# Patient Record
Sex: FEMALE | Race: OTHER | NOT HISPANIC OR LATINO | URBAN - METROPOLITAN AREA
[De-identification: names, ages, dates, MRNs, and addresses within clinical notes are randomized per-mention and may not be internally consistent; named-entity substitution may affect disease eponyms.]

---

## 2018-04-04 VITALS
DIASTOLIC BLOOD PRESSURE: 67 MMHG | RESPIRATION RATE: 18 BRPM | HEART RATE: 55 BPM | OXYGEN SATURATION: 98 % | SYSTOLIC BLOOD PRESSURE: 135 MMHG

## 2018-04-04 RX ORDER — CHLORHEXIDINE GLUCONATE 213 G/1000ML
1 SOLUTION TOPICAL ONCE
Qty: 0 | Refills: 0 | Status: DISCONTINUED | OUTPATIENT
Start: 2018-04-05 | End: 2018-04-20

## 2018-04-04 NOTE — H&P ADULT - ASSESSMENT
78 y.o Amhar[place] Female former smoker  with LIDOCAINE ALLERGY PMHx of  HTN, Hyperlipidemia, Chronic Atrial Fibrillation on Eliquis (Last dose on   4/2/18), Non-obstructive CAD by diagnostic cath @ [facility] on 02/20/15 who presented to her cardiologist Dr. Pickens c/o progressively worsening dyspnea on exertion with intermittent episodes of palpitations upon ambulation of more than two city blocks over the past 6 months.  In light of pt's risk factors, above CCS Anginal Class 3 Equivalent symptoms, and an abnormal Stress test, pt is now referred to [facility] for recommended Cardiac Cath with possible intervention if clinically indicated to  r/o suspected progressive CAD.    ASA III, Mallampati III    IV NS @ 75 cc/hr pre-cath fluids    Load with  mg x1, plavix 600 mg x1    Sedation Plan: Moderate  Patient is Suitable Candidate for Sedation: Yes  Risks & benefits of procedure and alternative therapy have been explained to the patient including but not limited to: allergic reaction, bleeding w/possible need for blood transfusion, infection, renal and vascular compromise, limb damage, arrhythmia, stroke, vessel dissection/perforation, Myocardial infarction, emergent CABG. Informed consent obtained and in chart

## 2018-04-04 NOTE — H&P ADULT - HISTORY OF PRESENT ILLNESS
***HISTORY OBTAINED FROM PATIENT VIA PACIFIC Syriac SPEAKING INTERPRETOR ID # [id number]      ***PT TO BRING IN ALL MEDS    78 y.o Serbian Female former smoker  with LIDOCAINE ALLERGY PMHx of  HTN, Hyperlipidemia, Chronic Atrial Fibrillation on Eliquis (Last dose on      ), Non-obstructive CAD by diagnostic cath @ [facility] on 02/20/15 who presented to her cardiologist Dr. Pickens c/o progressively worsening dyspnea on exertion with intermittent episodes of palpitations upon ambulation of more than two city blocks over the past 6 months.  Symptoms resolve after a few minutes of rest.  Denies CP, dizziness, syncope, recent PND/orthopnea, or LE edema.  Echo performed on 01/24/18 revealed no regional wall motion abnormalities, mild MR/TR, LVEF of 55-60%.      In light of pt's risk factors, above CCS Anginal Class 3 Equivalent symptoms, and an abnormal Stress test, pt is now referred to [facility] for recommended Cardiac Cath with possible intervention if clinically indicated to  r/o suspected progresice CAD ***HISTORY OBTAINED FROM PATIENT VIA PACIFIC Belarusian SPEAKING INTERPRETOR ID # [id number]      ***PT TO BRING IN ALL MEDS    78 y.o Dive[place] Female former smoker  with LIDOCAINE ALLERGY PMHx of  HTN, Hyperlipidemia, Chronic Atrial Fibrillation on Eliquis (Last dose on      ), Non-obstructive CAD by diagnostic cath @ [facility] on 02/20/15 who presented to her cardiologist Dr. Pickens c/o progressively worsening dyspnea on exertion with intermittent episodes of palpitations upon ambulation of more than two city blocks over the past 6 months.  Symptoms resolve after a few minutes of rest.  Denies CP, dizziness, syncope, recent PND/orthopnea, or LE edema.  Echo performed on 01/24/18 revealed no regional wall motion abnormalities, mild MR/TR, LVEF of 55-60%.  Nuclear Stress test 03/27/18 revealed anterior wall ischemia, LVEF of 63%.      In light of pt's risk factors, above CCS Anginal Class 3 Equivalent symptoms, and an abnormal Stress test, pt is now referred to [facility] for recommended Cardiac Cath with possible intervention if clinically indicated to  r/o suspected progressive CAD. ***HISTORY OBTAINED FROM PATIENT VIA PACIFIC Lao SPEAKING INTERPRETOR ID # [id number] & #[id number]    78 y.o Dive[place] Female former smoker  with LIDOCAINE ALLERGY PMHx of  HTN, Hyperlipidemia, Chronic Atrial Fibrillation on Eliquis (Last dose on   4/2/18), Non-obstructive CAD by diagnostic cath @ [facility] on 02/20/15 who presented to her cardiologist Dr. Pickens c/o progressively worsening dyspnea on exertion with intermittent episodes of palpitations upon ambulation of more than two city blocks over the past 6 months.  Symptoms resolve after a few minutes of rest.  Denies CP, dizziness, syncope, recent PND/orthopnea, or LE edema.  Echo performed on 01/24/18 revealed no regional wall motion abnormalities, mild MR/TR, LVEF of 55-60%.  Nuclear Stress test 03/27/18 revealed anterior wall ischemia, LVEF of 63%.      In light of pt's risk factors, above CCS Anginal Class 3 Equivalent symptoms, and an abnormal Stress test, pt is now referred to [facility] for recommended Cardiac Cath with possible intervention if clinically indicated to  r/o suspected progressive CAD.

## 2018-04-04 NOTE — H&P ADULT - NSHPLABSRESULTS_GEN_ALL_CORE
12.9   7.1   )-----------( 201      ( 05 Apr 2018 16:11 )             38.8     04-05    138  |  101  |  15  ----------------------------<  105<H>  4.6   |  23  |  0.95    Ca    10.4      05 Apr 2018 16:11    TPro  7.7  /  Alb  3.9  /  TBili  0.8  /  DBili  x   /  AST  31  /  ALT  14  /  AlkPhos  88  04-05    PT/INR - ( 05 Apr 2018 16:11 )   PT: 13.0 sec;   INR: 1.17          PTT - ( 05 Apr 2018 16:11 )  PTT:31.8 sec    CARDIAC MARKERS ( 05 Apr 2018 16:11 )  x     / x     / 100 U/L / x     / x          EKG:

## 2018-04-05 ENCOUNTER — OUTPATIENT (OUTPATIENT)
Dept: OUTPATIENT SERVICES | Facility: HOSPITAL | Age: 78
LOS: 1 days | Discharge: HOME | End: 2018-04-05
Payer: MEDICARE

## 2018-04-05 DIAGNOSIS — Z98.890 OTHER SPECIFIED POSTPROCEDURAL STATES: Chronic | ICD-10-CM

## 2018-04-05 LAB
ALBUMIN SERPL ELPH-MCNC: 3.9 G/DL — SIGNIFICANT CHANGE UP (ref 3.3–5)
ALP SERPL-CCNC: 88 U/L — SIGNIFICANT CHANGE UP (ref 40–120)
ALT FLD-CCNC: 14 U/L — SIGNIFICANT CHANGE UP (ref 10–45)
ANION GAP SERPL CALC-SCNC: 14 MMOL/L — SIGNIFICANT CHANGE UP (ref 5–17)
APTT BLD: 31.8 SEC — SIGNIFICANT CHANGE UP (ref 27.5–37.4)
AST SERPL-CCNC: 31 U/L — SIGNIFICANT CHANGE UP (ref 10–40)
BASOPHILS NFR BLD AUTO: 0.4 % — SIGNIFICANT CHANGE UP (ref 0–2)
BILIRUB SERPL-MCNC: 0.8 MG/DL — SIGNIFICANT CHANGE UP (ref 0.2–1.2)
BUN SERPL-MCNC: 15 MG/DL — SIGNIFICANT CHANGE UP (ref 7–23)
CALCIUM SERPL-MCNC: 10.4 MG/DL — SIGNIFICANT CHANGE UP (ref 8.4–10.5)
CHLORIDE SERPL-SCNC: 101 MMOL/L — SIGNIFICANT CHANGE UP (ref 96–108)
CHOLEST SERPL-MCNC: 181 MG/DL — SIGNIFICANT CHANGE UP (ref 10–199)
CK MB CFR SERPL CALC: 1.5 NG/ML — SIGNIFICANT CHANGE UP (ref 0–6.7)
CK SERPL-CCNC: 100 U/L — SIGNIFICANT CHANGE UP (ref 25–170)
CO2 SERPL-SCNC: 23 MMOL/L — SIGNIFICANT CHANGE UP (ref 22–31)
CREAT SERPL-MCNC: 0.95 MG/DL — SIGNIFICANT CHANGE UP (ref 0.5–1.3)
CRP SERPL-MCNC: 0.44 MG/DL — HIGH (ref 0–0.4)
EOSINOPHIL NFR BLD AUTO: 1.8 % — SIGNIFICANT CHANGE UP (ref 0–6)
GLUCOSE SERPL-MCNC: 105 MG/DL — HIGH (ref 70–99)
HBA1C BLD-MCNC: 5.3 % — SIGNIFICANT CHANGE UP (ref 4–5.6)
HCT VFR BLD CALC: 38.8 % — SIGNIFICANT CHANGE UP (ref 34.5–45)
HDLC SERPL-MCNC: 79 MG/DL — SIGNIFICANT CHANGE UP (ref 40–125)
HGB BLD-MCNC: 12.9 G/DL — SIGNIFICANT CHANGE UP (ref 11.5–15.5)
INR BLD: 1.17 — HIGH (ref 0.88–1.16)
LIPID PNL WITH DIRECT LDL SERPL: 90 MG/DL — SIGNIFICANT CHANGE UP
LYMPHOCYTES # BLD AUTO: 21.5 % — SIGNIFICANT CHANGE UP (ref 13–44)
MCHC RBC-ENTMCNC: 29.8 PG — SIGNIFICANT CHANGE UP (ref 27–34)
MCHC RBC-ENTMCNC: 33.2 G/DL — SIGNIFICANT CHANGE UP (ref 32–36)
MCV RBC AUTO: 89.6 FL — SIGNIFICANT CHANGE UP (ref 80–100)
MONOCYTES NFR BLD AUTO: 8.6 % — SIGNIFICANT CHANGE UP (ref 2–14)
NEUTROPHILS NFR BLD AUTO: 67.7 % — SIGNIFICANT CHANGE UP (ref 43–77)
PLATELET # BLD AUTO: 201 K/UL — SIGNIFICANT CHANGE UP (ref 150–400)
POTASSIUM SERPL-MCNC: 4.6 MMOL/L — SIGNIFICANT CHANGE UP (ref 3.5–5.3)
POTASSIUM SERPL-SCNC: 4.6 MMOL/L — SIGNIFICANT CHANGE UP (ref 3.5–5.3)
PROT SERPL-MCNC: 7.7 G/DL — SIGNIFICANT CHANGE UP (ref 6–8.3)
PROTHROM AB SERPL-ACNC: 13 SEC — HIGH (ref 9.8–12.7)
RBC # BLD: 4.33 M/UL — SIGNIFICANT CHANGE UP (ref 3.8–5.2)
RBC # FLD: 15.3 % — SIGNIFICANT CHANGE UP (ref 10.3–16.9)
SODIUM SERPL-SCNC: 138 MMOL/L — SIGNIFICANT CHANGE UP (ref 135–145)
TOTAL CHOLESTEROL/HDL RATIO MEASUREMENT: 2.3 RATIO — LOW (ref 3.3–7.1)
TRIGL SERPL-MCNC: 62 MG/DL — SIGNIFICANT CHANGE UP (ref 10–149)
WBC # BLD: 7.1 K/UL — SIGNIFICANT CHANGE UP (ref 3.8–10.5)
WBC # FLD AUTO: 7.1 K/UL — SIGNIFICANT CHANGE UP (ref 3.8–10.5)

## 2018-04-05 PROCEDURE — 93458 L HRT ARTERY/VENTRICLE ANGIO: CPT

## 2018-04-05 PROCEDURE — C1887: CPT

## 2018-04-05 PROCEDURE — 82550 ASSAY OF CK (CPK): CPT

## 2018-04-05 PROCEDURE — 86140 C-REACTIVE PROTEIN: CPT

## 2018-04-05 PROCEDURE — 85730 THROMBOPLASTIN TIME PARTIAL: CPT

## 2018-04-05 PROCEDURE — C1889: CPT

## 2018-04-05 PROCEDURE — 93458 L HRT ARTERY/VENTRICLE ANGIO: CPT | Mod: 26

## 2018-04-05 PROCEDURE — C1769: CPT

## 2018-04-05 PROCEDURE — 85610 PROTHROMBIN TIME: CPT

## 2018-04-05 PROCEDURE — 85025 COMPLETE CBC W/AUTO DIFF WBC: CPT

## 2018-04-05 PROCEDURE — 82553 CREATINE MB FRACTION: CPT

## 2018-04-05 PROCEDURE — 83036 HEMOGLOBIN GLYCOSYLATED A1C: CPT

## 2018-04-05 PROCEDURE — 93005 ELECTROCARDIOGRAM TRACING: CPT

## 2018-04-05 PROCEDURE — 80061 LIPID PANEL: CPT

## 2018-04-05 PROCEDURE — C1894: CPT

## 2018-04-05 PROCEDURE — 80053 COMPREHEN METABOLIC PANEL: CPT

## 2018-04-05 PROCEDURE — 93010 ELECTROCARDIOGRAM REPORT: CPT

## 2018-04-05 PROCEDURE — C1760: CPT

## 2018-04-05 PROCEDURE — 36415 COLL VENOUS BLD VENIPUNCTURE: CPT

## 2018-04-05 RX ORDER — GABAPENTIN 400 MG/1
1 CAPSULE ORAL
Qty: 0 | Refills: 0 | COMMUNITY

## 2018-04-05 RX ORDER — HYDROCORTISONE 1 %
1 OINTMENT (GRAM) TOPICAL
Qty: 0 | Refills: 0 | COMMUNITY

## 2018-04-05 RX ORDER — METOPROLOL TARTRATE 50 MG
1 TABLET ORAL
Qty: 0 | Refills: 0 | COMMUNITY

## 2018-04-05 RX ORDER — LEVOTHYROXINE SODIUM 125 MCG
1 TABLET ORAL
Qty: 0 | Refills: 0 | COMMUNITY

## 2018-04-05 RX ORDER — CYCLOSPORINE 0.5 MG/ML
1 EMULSION OPHTHALMIC
Qty: 0 | Refills: 0 | COMMUNITY

## 2018-04-05 RX ORDER — SODIUM CHLORIDE 9 MG/ML
500 INJECTION INTRAMUSCULAR; INTRAVENOUS; SUBCUTANEOUS
Qty: 0 | Refills: 0 | Status: DISCONTINUED | OUTPATIENT
Start: 2018-04-05 | End: 2018-04-20

## 2018-04-05 RX ORDER — ALPRAZOLAM 0.25 MG
1 TABLET ORAL
Qty: 0 | Refills: 0 | COMMUNITY

## 2018-04-05 RX ORDER — ASPIRIN/CALCIUM CARB/MAGNESIUM 324 MG
325 TABLET ORAL ONCE
Qty: 0 | Refills: 0 | Status: COMPLETED | OUTPATIENT
Start: 2018-04-05 | End: 2018-04-05

## 2018-04-05 RX ORDER — APIXABAN 2.5 MG/1
1 TABLET, FILM COATED ORAL
Qty: 0 | Refills: 0 | COMMUNITY

## 2018-04-05 RX ORDER — DIPHENHYDRAMINE HCL 50 MG
50 CAPSULE ORAL ONCE
Qty: 0 | Refills: 0 | Status: DISCONTINUED | OUTPATIENT
Start: 2018-04-05 | End: 2018-04-20

## 2018-04-05 RX ORDER — DILTIAZEM HCL 120 MG
1 CAPSULE, EXT RELEASE 24 HR ORAL
Qty: 0 | Refills: 0 | COMMUNITY

## 2018-04-05 RX ORDER — LOSARTAN POTASSIUM 100 MG/1
1 TABLET, FILM COATED ORAL
Qty: 0 | Refills: 0 | COMMUNITY

## 2018-04-05 RX ORDER — LINACLOTIDE 145 UG/1
1 CAPSULE, GELATIN COATED ORAL
Qty: 0 | Refills: 0 | COMMUNITY

## 2018-04-05 RX ORDER — CLOPIDOGREL BISULFATE 75 MG/1
600 TABLET, FILM COATED ORAL ONCE
Qty: 0 | Refills: 0 | Status: COMPLETED | OUTPATIENT
Start: 2018-04-05 | End: 2018-04-05

## 2018-04-05 RX ORDER — ERGOCALCIFEROL 1.25 MG/1
50000 CAPSULE ORAL
Qty: 0 | Refills: 0 | COMMUNITY

## 2018-04-05 RX ORDER — HYDROCORTISONE 20 MG
200 TABLET ORAL ONCE
Qty: 0 | Refills: 0 | Status: DISCONTINUED | OUTPATIENT
Start: 2018-04-05 | End: 2018-04-20

## 2018-04-05 RX ORDER — DEXLANSOPRAZOLE 30 MG/1
1 CAPSULE, DELAYED RELEASE ORAL
Qty: 0 | Refills: 0 | COMMUNITY

## 2018-04-05 RX ORDER — GABAPENTIN 400 MG/1
0 CAPSULE ORAL
Qty: 0 | Refills: 0 | COMMUNITY

## 2018-04-05 RX ADMIN — Medication 325 MILLIGRAM(S): at 17:04

## 2018-04-05 RX ADMIN — CLOPIDOGREL BISULFATE 600 MILLIGRAM(S): 75 TABLET, FILM COATED ORAL at 17:20

## 2018-04-05 NOTE — PROGRESS NOTE ADULT - SUBJECTIVE AND OBJECTIVE BOX
Interventional Cardiology PA SDA Discharge Note    Patient without complaints. Ambulated and voided without difficulties    Afebrile, VSS    Ext:    		Right         Groin:   No    hematoma,  no   bruit, dressing; C/D/I  		    Pulses:    intact DP/PT to baseline     A/P:    78 y.o Turk[place] Female former smoker  with LIDOCAINE ALLERGY PMHx of  HTN, Hyperlipidemia, Chronic Atrial Fibrillation on Eliquis (Last dose on   4/2/18), Non-obstructive CAD by diagnostic cath @ [facility] on 02/20/15 who presented to her cardiologist Dr. Pickens c/o progressively worsening dyspnea on exertion with intermittent episodes of palpitations upon ambulation of more than two city blocks over the past 6 months.  In light of pt's risk factors, above CCS Anginal Class 3 Equivalent symptoms, and an abnormal Stress test, pt is now referred to [facility] for recommended Cardiac Cath with possible intervention if clinically indicated to  r/o suspected progressive CAD. Pt now s/p cardiac cath showing nonobstructive CAD, EF 60-65, EDP 4, R groin perclose. Pt will be medically managed.     Of note: informed by RN @7pm that pt with 3 hives on neck. Pt breathing comfortably and has never had a rxn to contrast dye with past caths and eats shellfish without any allergic rxn.  Benadryl 50 mg IVP x 1 given and pt continued to be observed.       1.	Stable for discharge as per attending  ____Celio_____ after bed rest, pt voids, groin/wrist stable and 30 minutes of ambulation.  2.	Follow-up with PMD/Cardiologist __Celio_________ in 1-2 weeks  3.	Discharged forms signed and copies in chart Interventional Cardiology PA SDA Discharge Note    Patient without complaints. Ambulated and voided without difficulties    Afebrile, VSS    Ext:    		Right         Groin:   No    hematoma,  no   bruit, dressing; C/D/I  		    Pulses:    intact DP/PT to baseline     A/P:    78 y.o Serbian Female former smoker  with LIDOCAINE ALLERGY PMHx of  HTN, Hyperlipidemia, Chronic Atrial Fibrillation on Eliquis (Last dose on   4/2/18), Non-obstructive CAD by diagnostic cath @ [facility] on 02/20/15 who presented to her cardiologist Dr. Pickens c/o progressively worsening dyspnea on exertion with intermittent episodes of palpitations upon ambulation of more than two city blocks over the past 6 months.  In light of pt's risk factors, above CCS Anginal Class 3 Equivalent symptoms, and an abnormal Stress test, pt is now referred to [facility] for recommended Cardiac Cath with possible intervention if clinically indicated to  r/o suspected progressive CAD. Pt now s/p cardiac cath showing nonobstructive CAD, EF 60-65, EDP 4, R groin perclose. Pt will be medically managed.     Of note: informed by RN @7pm that pt with 3 hives on neck. Pt breathing comfortably and has never had a rxn to contrast dye with past caths and eats shellfish without any allergic rxn.  Benadryl 50 mg IVP x 1 given and solucortef 200 mg IVP x1 given and pt continued to be observed prior to discharge.        1.	Stable for discharge as per attending  ____Celio_____ after bed rest, pt voids, groin/wrist stable and 30 minutes of ambulation.  2.	Follow-up with PMD/Cardiologist __Celio_________ in 1-2 weeks  3.	Discharged forms signed and copies in chart Interventional Cardiology PA SDA Discharge Note    Patient without complaints. Ambulated and voided without difficulties    Afebrile, VSS    Ext:    		Right         Groin:   No    hematoma,  no   bruit, dressing; C/D/I  		    Pulses:    intact DP/PT to baseline     A/P:    78 y.o Kinyarw[place] Female former smoker  with LIDOCAINE ALLERGY PMHx of  HTN, Hyperlipidemia, Chronic Atrial Fibrillation on Eliquis (Last dose on   4/2/18), Non-obstructive CAD by diagnostic cath @ [facility] on 02/20/15 who presented to her cardiologist Dr. Pickens c/o progressively worsening dyspnea on exertion with intermittent episodes of palpitations upon ambulation of more than two city blocks over the past 6 months.  In light of pt's risk factors, above CCS Anginal Class 3 Equivalent symptoms, and an abnormal Stress test, pt is now referred to [facility] for recommended Cardiac Cath with possible intervention if clinically indicated to  r/o suspected progressive CAD. Pt now s/p cardiac cath showing nonobstructive CAD, EF 60-65, EDP 4, R groin perclose. Pt will be medically managed.     Of note: informed by RN @7pm that pt with 3 hives on neck. Pt breathing comfortably and has never had a rxn to contrast dye with past caths and eats shellfish without any allergic rxn.  Benadryl 50 mg IVP x 1 given and solucortef 200 mg IVP x1 given and pt continued to be observed prior to discharge.        1.	Stable for discharge as per attending  ____Celio_____ after bed rest, pt voids, groin stable and 30 minutes of ambulation.  2.	Follow-up with PMD/Cardiologist __Celio_________ in 1-2 weeks  3.	Discharged forms signed and copies in chart

## 2018-04-18 DIAGNOSIS — I48.2 CHRONIC ATRIAL FIBRILLATION: ICD-10-CM

## 2018-04-18 DIAGNOSIS — Z87.891 PERSONAL HISTORY OF NICOTINE DEPENDENCE: ICD-10-CM

## 2018-04-18 DIAGNOSIS — Z79.01 LONG TERM (CURRENT) USE OF ANTICOAGULANTS: ICD-10-CM

## 2018-04-18 DIAGNOSIS — E78.5 HYPERLIPIDEMIA, UNSPECIFIED: ICD-10-CM

## 2018-04-18 DIAGNOSIS — I10 ESSENTIAL (PRIMARY) HYPERTENSION: ICD-10-CM

## 2018-04-18 DIAGNOSIS — I25.110 ATHEROSCLEROTIC HEART DISEASE OF NATIVE CORONARY ARTERY WITH UNSTABLE ANGINA PECTORIS: ICD-10-CM

## 2018-04-18 DIAGNOSIS — I35.8 OTHER NONRHEUMATIC AORTIC VALVE DISORDERS: ICD-10-CM

## 2018-04-18 DIAGNOSIS — R00.2 PALPITATIONS: ICD-10-CM
